# Patient Record
Sex: FEMALE | Race: OTHER | NOT HISPANIC OR LATINO | URBAN - METROPOLITAN AREA
[De-identification: names, ages, dates, MRNs, and addresses within clinical notes are randomized per-mention and may not be internally consistent; named-entity substitution may affect disease eponyms.]

---

## 2020-10-19 ENCOUNTER — EMERGENCY (EMERGENCY)
Facility: HOSPITAL | Age: 75
LOS: 0 days | Discharge: HOME | End: 2020-10-19
Attending: STUDENT IN AN ORGANIZED HEALTH CARE EDUCATION/TRAINING PROGRAM | Admitting: STUDENT IN AN ORGANIZED HEALTH CARE EDUCATION/TRAINING PROGRAM
Payer: COMMERCIAL

## 2020-10-19 VITALS
WEIGHT: 171.96 LBS | TEMPERATURE: 99 F | HEART RATE: 64 BPM | RESPIRATION RATE: 18 BRPM | DIASTOLIC BLOOD PRESSURE: 77 MMHG | SYSTOLIC BLOOD PRESSURE: 177 MMHG | OXYGEN SATURATION: 97 %

## 2020-10-19 DIAGNOSIS — J02.9 ACUTE PHARYNGITIS, UNSPECIFIED: ICD-10-CM

## 2020-10-19 DIAGNOSIS — J02.8 ACUTE PHARYNGITIS DUE TO OTHER SPECIFIED ORGANISMS: ICD-10-CM

## 2020-10-19 PROCEDURE — 99283 EMERGENCY DEPT VISIT LOW MDM: CPT

## 2020-10-19 PROCEDURE — 99053 MED SERV 10PM-8AM 24 HR FAC: CPT

## 2020-10-19 RX ORDER — DEXAMETHASONE 0.5 MG/5ML
10 ELIXIR ORAL ONCE
Refills: 0 | Status: COMPLETED | OUTPATIENT
Start: 2020-10-19 | End: 2020-10-19

## 2020-10-19 RX ORDER — ACETAMINOPHEN 500 MG
650 TABLET ORAL ONCE
Refills: 0 | Status: COMPLETED | OUTPATIENT
Start: 2020-10-19 | End: 2020-10-19

## 2020-10-19 RX ORDER — IBUPROFEN 200 MG
1 TABLET ORAL
Qty: 9 | Refills: 0
Start: 2020-10-19 | End: 2020-10-21

## 2020-10-19 RX ADMIN — Medication 10 MILLIGRAM(S): at 01:18

## 2020-10-19 RX ADMIN — Medication 650 MILLIGRAM(S): at 01:19

## 2020-10-19 RX ADMIN — Medication 650 MILLIGRAM(S): at 01:18

## 2020-10-19 NOTE — ED PROVIDER NOTE - PATIENT PORTAL LINK FT
You can access the FollowMyHealth Patient Portal offered by [facility] by registering at the following website: [email or web address]. By joining [employer]’s FollowMyHealth portal, you will also be able to view your health information using other applications (apps) compatible with our system.

## 2020-10-19 NOTE — ED PROVIDER NOTE - NS ED ROS FT
Constitutional: No fever, chills.  Eyes: No visual changes.  ENT: see hpi  Neck: No neck pain or stiffness.  Cardiovascular: No chest pain, palpitations, edema.  Pulmonary: No SOB, cough. No hemoptysis.  Abdominal:  No nausea, vomiting, diarrhea.  : No dysuria, frequency.  Neuro: No headache, syncope, dizziness.  MS: No back pain. No calf pain/swelling.  Psych: No suicidal ideations.

## 2020-10-19 NOTE — ED ADULT NURSE NOTE - OBJECTIVE STATEMENT
Pt presents to ED c/o sore throat since thurs after returning from [place]. Pt is English speaking; pt prefers daughter to translate. Pt is awake alert and not in any distress.

## 2020-10-19 NOTE — ED PROVIDER NOTE - OBJECTIVE STATEMENT
74 yold female to Ed [facility] Htn Hld, Dm, depression c/o sore throat difficulty swallowing, hoarse voice x 2 days; pt recently returned for [place]; sx gradual onset denies any trauma or caused by food intake; pt denies fever, chills, n/v, sob, chest pain, abdominal pain, back pain; pt compliant with meds and blood sugars well controlled

## 2020-10-19 NOTE — ED PROVIDER NOTE - ATTENDING CONTRIBUTION TO CARE
74F with PMH HTN, well-controlled T2DM who presents to [facility] for sore throat x2 days. She reports odynophagia and hoarse voice; denies dysphagia, oropharyngeal swelling, stridor, cough, or decreased oral intake. Her sister which lives in the same household also has a cold. Denies fevers, chills, n/v, rashes.     Gen - NAD, Head - NCAT, TMs - clear b/l, Pharynx - clear, MMM, uvula midline, no oropharyngeal erythema, no lymphadenopathy, Heart - RRR, no m/g/r, Lungs - CTAB, no w/c/r, Abdomen - soft, NT, ND, Skin - No rash, Extremities - FROM, no edema, erythema, ecchymosis, Neuro - CN 2-12 intact, nl strength and sensation, nl gait.

## 2020-10-19 NOTE — ED PROVIDER NOTE - CLINICAL SUMMARY MEDICAL DECISION MAKING FREE TEXT BOX
74F with PMH HTN, well-controlled T2DM who presents to [facility] for sore throat x2 days. She reports odynophagia and hoarse voice; denies dysphagia, oropharyngeal swelling, stridor, cough, or decreased oral intake. Her sister which lives in the same household also has a cold. Pt well appearing, afebrile, no oropharyngeal erythema or exudates. Uvula midline, no swelling, no lymphadenopathy. Suspect viral URI given household sick contacts. Pt given toradol, decadron, and requesting Rx; no indication for abx, will send motrin to pharmacy. I have fully discussed the medical management and delivery of care with the patient. I have discussed any available labs, imaging and treatment options with the patient. All Questions answered at the bedside. Patient confirms understanding and has been given detailed return precautions. Patient instructed to return to the ED should symptoms persist or worsen. Patient has demonstrated capacity and has verbalized understanding. Patient is well appearing upon discharge, ambulatory with a steady gait.

## 2020-10-19 NOTE — ED ADULT TRIAGE NOTE - CHIEF COMPLAINT QUOTE
Pt c/o sore throat and hurts to swallow and looses her voice, all started on Friday, denies cough and fever. Just recently traveled from [place].

## 2020-10-19 NOTE — ED PROVIDER NOTE - PHYSICAL EXAMINATION
Constitutional: Well developed, well nourished. NAD; pt laying flat on her back; no drooling/stridor;  Head: Normocephalic, atraumatic.  Eyes: PERRL, EOMI.  ENT: minimal pharyngeal erythema; no exudates, uvula midline; ; Ears: clear canals/tm  Neck: Supple. Painless ROM. no lymphadenopathy  Cardiovascular: Regular rate and rhythm.   Pulmonary: Lungs clear to auscultation bilaterally.   Abdominal: Soft. Nondistended. No rebound, guarding, rigidity.  Extremities. Pelvis stable. No lower extremity edema, symmetric calves.  Skin: No rashes, cyanosis.  Neuro: AAOx3. No focal neurological deficits.  Psych: Normal mood. Normal affect.

## 2020-10-19 NOTE — ED PROVIDER NOTE - NSFOLLOWUPCLINICS_GEN_ALL_ED_FT
[facility]  Medicine  [address], [place]   Phone: [phone]  Fax:   Follow Up Time: 1-3 Days

## 2020-10-19 NOTE — ED ADULT NURSE NOTE - CHIEF COMPLAINT QUOTE
Pt c/o sore throat and hurts to swallow and looses her voice, all started on Friday, denies cough and fever. Just recently traveled from [place].

## 2020-10-21 ENCOUNTER — EMERGENCY (EMERGENCY)
Facility: HOSPITAL | Age: 75
LOS: 0 days | Discharge: HOME | End: 2020-10-22
Attending: EMERGENCY MEDICINE | Admitting: EMERGENCY MEDICINE
Payer: COMMERCIAL

## 2020-10-21 VITALS
OXYGEN SATURATION: 96 % | WEIGHT: 187.39 LBS | DIASTOLIC BLOOD PRESSURE: 60 MMHG | HEART RATE: 60 BPM | TEMPERATURE: 99 F | RESPIRATION RATE: 20 BRPM | SYSTOLIC BLOOD PRESSURE: 141 MMHG

## 2020-10-21 DIAGNOSIS — I10 ESSENTIAL (PRIMARY) HYPERTENSION: ICD-10-CM

## 2020-10-21 DIAGNOSIS — R35.0 FREQUENCY OF MICTURITION: ICD-10-CM

## 2020-10-21 DIAGNOSIS — R06.02 SHORTNESS OF BREATH: ICD-10-CM

## 2020-10-21 DIAGNOSIS — U07.1 COVID-19: ICD-10-CM

## 2020-10-21 DIAGNOSIS — Z79.899 OTHER LONG TERM (CURRENT) DRUG THERAPY: ICD-10-CM

## 2020-10-21 DIAGNOSIS — E11.9 TYPE 2 DIABETES MELLITUS WITHOUT COMPLICATIONS: ICD-10-CM

## 2020-10-21 PROCEDURE — 99285 EMERGENCY DEPT VISIT HI MDM: CPT | Mod: 25

## 2020-10-21 PROCEDURE — 36000 PLACE NEEDLE IN VEIN: CPT

## 2020-10-21 PROCEDURE — 99053 MED SERV 10PM-8AM 24 HR FAC: CPT

## 2020-10-21 RX ORDER — SODIUM CHLORIDE 9 MG/ML
1000 INJECTION, SOLUTION INTRAVENOUS ONCE
Refills: 0 | Status: COMPLETED | OUTPATIENT
Start: 2020-10-21 | End: 2020-10-21

## 2020-10-21 NOTE — ED PROVIDER NOTE - NS ED ROS FT
Constitutional: see hpi  Eyes:  No visual changes, eye pain or discharge.  ENMT:  No hearing changes, pain, +sore throat no runny nose, no difficulty swallowing  Cardiac:  No chest pain, SOB or edema. No chest pain with exertion.  Respiratory:  +cough no respiratory distress. No hemoptysis. No history of asthma or RAD.  GI:  No nausea, vomiting, diarrhea or abdominal pain.  :  No dysuria, +frequency no burning.  MS:  No myalgia, muscle weakness, joint pain or back pain.  Neuro:  No headache or weakness.  No LOC.  Skin:  No skin rash.   Endocrine: No history of thyroid disease +diabetes.

## 2020-10-21 NOTE — ED PROVIDER NOTE - PATIENT PORTAL LINK FT
You can access the FollowMyHealth Patient Portal offered by [facility] by registering at the following website: [email or web address]. By joining [employer]’s FollowMyHealth portal, you will also be able to view your health information using other applications (apps) compatible with our system.

## 2020-10-21 NOTE — ED PROVIDER NOTE - NSFOLLOWUPCLINICS_GEN_ALL_ED_FT
[facility]  Medicine  [address], [place]   Phone: [phone]  Fax:   Follow Up Time: 7-10 Days

## 2020-10-21 NOTE — ED PROVIDER NOTE - PROVIDER TOKENS
FREE:[LAST:[PMD],PHONE:[(   )    -],FAX:[(   )    -],ADDRESS:[a PMD],FOLLOWUP:[7-10 Days],ESTABLISHEDPATIENT:[T]]

## 2020-10-21 NOTE — ED PROVIDER NOTE - PHYSICAL EXAMINATION
PE:  nad  skin hot, diaphoretic  no rhinorrea, op clear pharynx nl  ncat  neck supple  rrr nl s1s2 no mrg  ctab no wrr  abd soft ntnd no palpable masses no rgr  back non-tender no cvat  ext no cce dpi  neuro aaox3 grossly nf exam

## 2020-10-21 NOTE — ED PROVIDER NOTE - PROGRESS NOTE DETAILS
Post Discharge note: received notification from [facility]: blood cultures positive for gram + cocci in clusters; attempted to call pt back on number listed - no answer; will try again in am; after review of chart pt also noted to be covid +;

## 2020-10-21 NOTE — ED PROVIDER NOTE - NSFOLLOWUPINSTRUCTIONS_ED_ALL_ED_FT
You are being discharged with a diagnosis of viral illness and do not require hospitalization.  At this time, we are suspicious that you might have coronavirus. Your test results will be texted to your granddaughter Payal as requested.    If you are well enough to be discharged home and are not in a high risk group to be admitted, you should care for yourself at home exactly like you would if you have Influenza “flu”. Follow all the standard guidelines about washing your hands, covering your cough, etc. If you feel unwell, stay home, rest and drink plenty of clear fluids. Keep track of your symptoms. You should return to the Emergency Department if you develop worse symptoms, trouble breathing, chest pain, and/or a fever that doesn’t improve with over the counter medications.    ______________________________________________________________________________  NOVEL CORONA VIRUS (COVID-19): The Facts    What is a coronavirus?  Coronaviruses are a large family of viruses that cause illnesses ranging from the common cold to more severe diseases such as Middle East Respiratory Syndrome (MERS) and Severe Acute Respiratory Syndrome (SARS).    What is Novel Coronavirus (COVID-19)?  COVID-19 is a new strain of Coronavirus that has not been previously identified in humans. COVID-19 was identified in [place], [place], [place] in December 2019 (COVID-19). COVID-19 has since been identified outside of [place], in a growing number of countries internationally, including the [place].    Where can I find the most recent information about COVID-19?  The Centers for Disease Control and Prevention (CDC) is closely monitoring the outbreak caused by the COVID-19. For the latest information about COVID- 19, visit the CDC website at https://www.cdc.gov/coronavirus/index.html    How are coronaviruses spread?  Coronaviruses can be transmitted from person-to- person, usually after close contact with an infected person,for example, in a household, workplace, or healthcare setting via droplets that become airborne after a cough or sneeze by an affected person. These droplets can then infect a nearby person. It is likely transmission also occurs by touching recently contaminated surfaces.    What are the symptoms of coronavirus infection?  It depends on the virus, but common signs include fever and/or respiratory symptoms such as cough and shortness of breath. In more severe cases, infection can cause pneumonia, severe acute respiratory syndrome, kidney failure and even death. Fortunately, most cases of COVID-19 have an illness no different than the influenza “flu”. With a majority of these patients having mild symptoms and overall mortality which appears to be not much different than the flu.    Is there a treatment for a COVID-19?  There is no specific treatment for disease caused by COVID-19. However, many of the symptoms canbe treated based on the patient’s clinical condition. Supportive care for infected persons can be highly effective.    What can I do to protect myself?  Washing your hands, covering your cough, and disinfecting surfaces are the best precautionary measures. It is also advisable to avoid close contact with anyone showing symptoms of respiratory illness such as coughing and sneezing. Those with symptoms should wear a surgical mask when around others.    What can I do to protect those around me?  If you have been identified as someone who may be infected with COVID-19, we recommend you follow the self-isolation procedures outlined below to protect those around you and limit the spread of this virus.     Recommendations for Patients Advised to Self-Isolate for Possible COVID-19 Exposure  - Do not go to work, school, or public areas. Avoid using public transportation, ride-sharing, or taxis.  - As much as possible, separate yourself from other people in your home. If you can, you should stay in a room and away from other people in your home. Also, you should use a separate bathroom, if available.  - Wear the supplied mask whenever you are around other people.  - If you have a non-urgent medical appointment, please reschedule for a later date. If the appointment is urgent, please call the healthcare provider and tell them that you are on selfisolation for possible COVID-19. This will help the healthcare provider’s office take steps to keep other people from getting infected or exposed. If you can reschedule routine appointments, do so.  - Wash your hands often with soap and water for at least 15 to 20 seconds or clean your hands with an alcohol-based hand  that contains 60 to 95% alcohol, covering all surfaces of your hands and rubbing them together until they feel dry. Soap and water should be used preferentially if hands are visibly dirty.  - Cover your mouth and nose with a tissue when you cough or sneeze. Throw used tissues in a lined trash can; immediately wash your hands.  - Avoid touching your eyes, nose, and mouth with your hands.  - Avoid sharing personal household items. You should not share dishes, drinking glasses, cups, eating utensils, towels, or bedding with other people or pets in your home. After using these items, they should be washed thoroughly with soap and water.  - Clean and disinfect all “high-touch” surfaces every day. High touch surfaces include counters, tabletops, doorknobs, light switches, remote controls, bathroom fixtures, toilets, phones, keyboards, tablets, and bedside tables. Also, clean any surfaces that may have blood, stool, or body fluids on them.   -Please also refer to this helpful video on how to set up your home for self-quarantine or self-isolation: https://youtu.[email or web address]

## 2020-10-21 NOTE — ED PROVIDER NOTE - CARE PROVIDER_API CALL
PMD,   stacey PMD  Phone: (   )    -  Fax: (   )    -  Established Patient  Follow Up Time: 7-10 Days

## 2020-10-21 NOTE — ED ADULT NURSE NOTE - CHPI ED NUR SYMPTOMS NEG
no body aches/no chest pain/no chills/no cough/no wheezing/no diaphoresis/no edema/no headache/no hemoptysis

## 2020-10-21 NOTE — ED ADULT NURSE NOTE - CHIEF COMPLAINT QUOTE
Pt was brought by her family for sob, fever 102, urinary incontinence. Pt recently returned from [place], recent exposure to COVID "+" relatives.

## 2020-10-21 NOTE — ED ADULT NURSE NOTE - OBJECTIVE STATEMENT
Pt was brought by her family for sob, fever 102, urinary incontinence. Pt recently returned from [place], recent exposure to COVID "+" relatives.

## 2020-10-21 NOTE — ED ADULT TRIAGE NOTE - CHIEF COMPLAINT QUOTE
Pt was brought by her family for sob, fever 102, urinary incontinence. Pt recently returned from [place], recent exposure to COVID "+" relatives.

## 2020-10-21 NOTE — ED PROVIDER NOTE - OBJECTIVE STATEMENT
74F h/o dm, htn p/w viral sx x 5d. Fever, diaphoresis, sore throat, cough & urinary frequency. Visiting from [place], [place] to [place] ago, family rpts that she visited her sister in [place] 2d prior to travelling here & family just found out that sister has active covid. Denies ha, neck pain or stiffness, cp/sob/moise, nvd, abd pain, flank pain, dysuria, rash. No recent abx.

## 2020-10-22 VITALS
HEART RATE: 76 BPM | DIASTOLIC BLOOD PRESSURE: 69 MMHG | RESPIRATION RATE: 18 BRPM | OXYGEN SATURATION: 99 % | TEMPERATURE: 99 F | SYSTOLIC BLOOD PRESSURE: 142 MMHG

## 2020-10-22 LAB
ALBUMIN SERPL ELPH-MCNC: 3.9 G/DL — SIGNIFICANT CHANGE UP (ref 3.5–5.2)
ALP SERPL-CCNC: 60 U/L — SIGNIFICANT CHANGE UP (ref 30–115)
ALT FLD-CCNC: 8 U/L — SIGNIFICANT CHANGE UP (ref 0–41)
ANION GAP SERPL CALC-SCNC: 11 MMOL/L — SIGNIFICANT CHANGE UP (ref 7–14)
APPEARANCE UR: CLEAR — SIGNIFICANT CHANGE UP
APTT BLD: 28.4 SEC — SIGNIFICANT CHANGE UP (ref 27–39.2)
AST SERPL-CCNC: 15 U/L — SIGNIFICANT CHANGE UP (ref 0–41)
BACTERIA # UR AUTO: NEGATIVE — SIGNIFICANT CHANGE UP
BASOPHILS # BLD AUTO: 0.01 K/UL — SIGNIFICANT CHANGE UP (ref 0–0.2)
BASOPHILS NFR BLD AUTO: 0.2 % — SIGNIFICANT CHANGE UP (ref 0–1)
BILIRUB SERPL-MCNC: <0.2 MG/DL — SIGNIFICANT CHANGE UP (ref 0.2–1.2)
BILIRUB UR-MCNC: NEGATIVE — SIGNIFICANT CHANGE UP
BUN SERPL-MCNC: 12 MG/DL — SIGNIFICANT CHANGE UP (ref 10–20)
CALCIUM SERPL-MCNC: 9.1 MG/DL — SIGNIFICANT CHANGE UP (ref 8.5–10.1)
CHLORIDE SERPL-SCNC: 96 MMOL/L — LOW (ref 98–110)
CO2 SERPL-SCNC: 29 MMOL/L — SIGNIFICANT CHANGE UP (ref 17–32)
COLOR SPEC: SIGNIFICANT CHANGE UP
CREAT SERPL-MCNC: 0.7 MG/DL — SIGNIFICANT CHANGE UP (ref 0.7–1.5)
DIFF PNL FLD: NEGATIVE — SIGNIFICANT CHANGE UP
EOSINOPHIL # BLD AUTO: 0.02 K/UL — SIGNIFICANT CHANGE UP (ref 0–0.7)
EOSINOPHIL NFR BLD AUTO: 0.4 % — SIGNIFICANT CHANGE UP (ref 0–8)
EPI CELLS # UR: 1 /HPF — SIGNIFICANT CHANGE UP (ref 0–5)
GLUCOSE SERPL-MCNC: 127 MG/DL — HIGH (ref 70–99)
GLUCOSE UR QL: NEGATIVE — SIGNIFICANT CHANGE UP
HCT VFR BLD CALC: 35.3 % — LOW (ref 37–47)
HGB BLD-MCNC: 10.9 G/DL — LOW (ref 12–16)
HYALINE CASTS # UR AUTO: 1 /LPF — SIGNIFICANT CHANGE UP (ref 0–7)
IMM GRANULOCYTES NFR BLD AUTO: 0.4 % — HIGH (ref 0.1–0.3)
INR BLD: 1.07 RATIO — SIGNIFICANT CHANGE UP (ref 0.65–1.3)
KETONES UR-MCNC: NEGATIVE — SIGNIFICANT CHANGE UP
LACTATE SERPL-SCNC: 1.6 MMOL/L — SIGNIFICANT CHANGE UP (ref 0.7–2)
LEUKOCYTE ESTERASE UR-ACNC: NEGATIVE — SIGNIFICANT CHANGE UP
LYMPHOCYTES # BLD AUTO: 1.13 K/UL — LOW (ref 1.2–3.4)
LYMPHOCYTES # BLD AUTO: 24.7 % — SIGNIFICANT CHANGE UP (ref 20.5–51.1)
MCHC RBC-ENTMCNC: 24.2 PG — LOW (ref 27–31)
MCHC RBC-ENTMCNC: 30.9 G/DL — LOW (ref 32–37)
MCV RBC AUTO: 78.4 FL — LOW (ref 81–99)
MONOCYTES # BLD AUTO: 0.31 K/UL — SIGNIFICANT CHANGE UP (ref 0.1–0.6)
MONOCYTES NFR BLD AUTO: 6.8 % — SIGNIFICANT CHANGE UP (ref 1.7–9.3)
NEUTROPHILS # BLD AUTO: 3.08 K/UL — SIGNIFICANT CHANGE UP (ref 1.4–6.5)
NEUTROPHILS NFR BLD AUTO: 67.5 % — SIGNIFICANT CHANGE UP (ref 42.2–75.2)
NITRITE UR-MCNC: NEGATIVE — SIGNIFICANT CHANGE UP
NRBC # BLD: 0 /100 WBCS — SIGNIFICANT CHANGE UP (ref 0–0)
PH UR: 7 — SIGNIFICANT CHANGE UP (ref 5–8)
PLATELET # BLD AUTO: 222 K/UL — SIGNIFICANT CHANGE UP (ref 130–400)
POTASSIUM SERPL-MCNC: 3.9 MMOL/L — SIGNIFICANT CHANGE UP (ref 3.5–5)
POTASSIUM SERPL-SCNC: 3.9 MMOL/L — SIGNIFICANT CHANGE UP (ref 3.5–5)
PROT SERPL-MCNC: 6.8 G/DL — SIGNIFICANT CHANGE UP (ref 6–8)
PROT UR-MCNC: ABNORMAL
PROTHROM AB SERPL-ACNC: 12.3 SEC — SIGNIFICANT CHANGE UP (ref 9.95–12.87)
RBC # BLD: 4.5 M/UL — SIGNIFICANT CHANGE UP (ref 4.2–5.4)
RBC # FLD: 13.9 % — SIGNIFICANT CHANGE UP (ref 11.5–14.5)
RBC CASTS # UR COMP ASSIST: 4 /HPF — SIGNIFICANT CHANGE UP (ref 0–4)
SARS-COV-2 RNA SPEC QL NAA+PROBE: DETECTED
SODIUM SERPL-SCNC: 136 MMOL/L — SIGNIFICANT CHANGE UP (ref 135–146)
SP GR SPEC: 1.01 — SIGNIFICANT CHANGE UP (ref 1.01–1.03)
UROBILINOGEN FLD QL: SIGNIFICANT CHANGE UP
WBC # BLD: 4.57 K/UL — LOW (ref 4.8–10.8)
WBC # FLD AUTO: 4.57 K/UL — LOW (ref 4.8–10.8)
WBC UR QL: 3 /HPF — SIGNIFICANT CHANGE UP (ref 0–5)

## 2020-10-22 PROCEDURE — 93010 ELECTROCARDIOGRAM REPORT: CPT

## 2020-10-22 PROCEDURE — 71045 X-RAY EXAM CHEST 1 VIEW: CPT | Mod: 26

## 2020-10-22 RX ADMIN — SODIUM CHLORIDE 1000 MILLILITER(S): 9 INJECTION, SOLUTION INTRAVENOUS at 01:05

## 2020-10-23 ENCOUNTER — INPATIENT (INPATIENT)
Facility: HOSPITAL | Age: 75
LOS: 0 days | Discharge: HOME | End: 2020-10-24
Attending: INTERNAL MEDICINE | Admitting: INTERNAL MEDICINE
Payer: MEDICAID

## 2020-10-23 VITALS
OXYGEN SATURATION: 95 % | DIASTOLIC BLOOD PRESSURE: 68 MMHG | TEMPERATURE: 98 F | HEART RATE: 62 BPM | RESPIRATION RATE: 20 BRPM | SYSTOLIC BLOOD PRESSURE: 158 MMHG

## 2020-10-23 PROBLEM — E11.9 TYPE 2 DIABETES MELLITUS WITHOUT COMPLICATIONS: Chronic | Status: ACTIVE | Noted: 2020-10-21

## 2020-10-23 PROBLEM — I10 ESSENTIAL (PRIMARY) HYPERTENSION: Chronic | Status: ACTIVE | Noted: 2020-10-21

## 2020-10-23 LAB
-  COAGULASE NEGATIVE STAPHYLOCOCCUS: SIGNIFICANT CHANGE UP
ALBUMIN SERPL ELPH-MCNC: 3.9 G/DL — SIGNIFICANT CHANGE UP (ref 3.5–5.2)
ALP SERPL-CCNC: 58 U/L — SIGNIFICANT CHANGE UP (ref 30–115)
ALT FLD-CCNC: 10 U/L — SIGNIFICANT CHANGE UP (ref 0–41)
ANION GAP SERPL CALC-SCNC: 11 MMOL/L — SIGNIFICANT CHANGE UP (ref 7–14)
AST SERPL-CCNC: 32 U/L — SIGNIFICANT CHANGE UP (ref 0–41)
BASOPHILS # BLD AUTO: 0 K/UL — SIGNIFICANT CHANGE UP (ref 0–0.2)
BASOPHILS NFR BLD AUTO: 0 % — SIGNIFICANT CHANGE UP (ref 0–1)
BILIRUB SERPL-MCNC: 0.2 MG/DL — SIGNIFICANT CHANGE UP (ref 0.2–1.2)
BUN SERPL-MCNC: 15 MG/DL — SIGNIFICANT CHANGE UP (ref 10–20)
CALCIUM SERPL-MCNC: 9.4 MG/DL — SIGNIFICANT CHANGE UP (ref 8.5–10.1)
CHLORIDE SERPL-SCNC: 97 MMOL/L — LOW (ref 98–110)
CO2 SERPL-SCNC: 27 MMOL/L — SIGNIFICANT CHANGE UP (ref 17–32)
CREAT SERPL-MCNC: 0.8 MG/DL — SIGNIFICANT CHANGE UP (ref 0.7–1.5)
CULTURE RESULTS: SIGNIFICANT CHANGE UP
D DIMER BLD IA.RAPID-MCNC: 527 NG/ML DDU — HIGH (ref 0–230)
D DIMER BLD IA.RAPID-MCNC: SIGNIFICANT CHANGE UP NG/ML DDU (ref 0–230)
EOSINOPHIL # BLD AUTO: 0.02 K/UL — SIGNIFICANT CHANGE UP (ref 0–0.7)
EOSINOPHIL NFR BLD AUTO: 0.5 % — SIGNIFICANT CHANGE UP (ref 0–8)
GLUCOSE BLDC GLUCOMTR-MCNC: 109 MG/DL — HIGH (ref 70–99)
GLUCOSE BLDC GLUCOMTR-MCNC: 136 MG/DL — HIGH (ref 70–99)
GLUCOSE SERPL-MCNC: 111 MG/DL — HIGH (ref 70–99)
GRAM STN FLD: SIGNIFICANT CHANGE UP
HCT VFR BLD CALC: 35.9 % — LOW (ref 37–47)
HGB BLD-MCNC: 11.2 G/DL — LOW (ref 12–16)
IMM GRANULOCYTES NFR BLD AUTO: 0.2 % — SIGNIFICANT CHANGE UP (ref 0.1–0.3)
LACTATE SERPL-SCNC: 1.5 MMOL/L — SIGNIFICANT CHANGE UP (ref 0.7–2)
LYMPHOCYTES # BLD AUTO: 1.05 K/UL — LOW (ref 1.2–3.4)
LYMPHOCYTES # BLD AUTO: 26.1 % — SIGNIFICANT CHANGE UP (ref 20.5–51.1)
MCHC RBC-ENTMCNC: 24.2 PG — LOW (ref 27–31)
MCHC RBC-ENTMCNC: 31.2 G/DL — LOW (ref 32–37)
MCV RBC AUTO: 77.7 FL — LOW (ref 81–99)
METHOD TYPE: SIGNIFICANT CHANGE UP
MONOCYTES # BLD AUTO: 0.24 K/UL — SIGNIFICANT CHANGE UP (ref 0.1–0.6)
MONOCYTES NFR BLD AUTO: 6 % — SIGNIFICANT CHANGE UP (ref 1.7–9.3)
NEUTROPHILS # BLD AUTO: 2.7 K/UL — SIGNIFICANT CHANGE UP (ref 1.4–6.5)
NEUTROPHILS NFR BLD AUTO: 67.2 % — SIGNIFICANT CHANGE UP (ref 42.2–75.2)
NRBC # BLD: 0 /100 WBCS — SIGNIFICANT CHANGE UP (ref 0–0)
PLATELET # BLD AUTO: 257 K/UL — SIGNIFICANT CHANGE UP (ref 130–400)
POTASSIUM SERPL-MCNC: 4.9 MMOL/L — SIGNIFICANT CHANGE UP (ref 3.5–5)
POTASSIUM SERPL-SCNC: 4.9 MMOL/L — SIGNIFICANT CHANGE UP (ref 3.5–5)
PROT SERPL-MCNC: 7.2 G/DL — SIGNIFICANT CHANGE UP (ref 6–8)
RBC # BLD: 4.62 M/UL — SIGNIFICANT CHANGE UP (ref 4.2–5.4)
RBC # FLD: 13.9 % — SIGNIFICANT CHANGE UP (ref 11.5–14.5)
SODIUM SERPL-SCNC: 135 MMOL/L — SIGNIFICANT CHANGE UP (ref 135–146)
SPECIMEN SOURCE: SIGNIFICANT CHANGE UP
WBC # BLD: 4.02 K/UL — LOW (ref 4.8–10.8)
WBC # FLD AUTO: 4.02 K/UL — LOW (ref 4.8–10.8)

## 2020-10-23 PROCEDURE — 71045 X-RAY EXAM CHEST 1 VIEW: CPT | Mod: 26

## 2020-10-23 PROCEDURE — 99285 EMERGENCY DEPT VISIT HI MDM: CPT

## 2020-10-23 PROCEDURE — 93010 ELECTROCARDIOGRAM REPORT: CPT

## 2020-10-23 RX ORDER — AMLODIPINE BESYLATE 2.5 MG/1
5 TABLET ORAL DAILY
Refills: 0 | Status: DISCONTINUED | OUTPATIENT
Start: 2020-10-23 | End: 2020-10-24

## 2020-10-23 RX ORDER — AZITHROMYCIN 500 MG/1
500 TABLET, FILM COATED ORAL ONCE
Refills: 0 | Status: COMPLETED | OUTPATIENT
Start: 2020-10-23 | End: 2020-10-23

## 2020-10-23 RX ORDER — ASPIRIN/CALCIUM CARB/MAGNESIUM 324 MG
100 TABLET ORAL DAILY
Refills: 0 | Status: DISCONTINUED | OUTPATIENT
Start: 2020-10-23 | End: 2020-10-24

## 2020-10-23 RX ORDER — SIMVASTATIN 20 MG/1
20 TABLET, FILM COATED ORAL AT BEDTIME
Refills: 0 | Status: DISCONTINUED | OUTPATIENT
Start: 2020-10-23 | End: 2020-10-24

## 2020-10-23 RX ORDER — ENOXAPARIN SODIUM 100 MG/ML
40 INJECTION SUBCUTANEOUS DAILY
Refills: 0 | Status: DISCONTINUED | OUTPATIENT
Start: 2020-10-23 | End: 2020-10-24

## 2020-10-23 RX ORDER — CEFTRIAXONE 500 MG/1
1000 INJECTION, POWDER, FOR SOLUTION INTRAMUSCULAR; INTRAVENOUS ONCE
Refills: 0 | Status: COMPLETED | OUTPATIENT
Start: 2020-10-23 | End: 2020-10-23

## 2020-10-23 RX ORDER — CHLORHEXIDINE GLUCONATE 213 G/1000ML
1 SOLUTION TOPICAL DAILY
Refills: 0 | Status: DISCONTINUED | OUTPATIENT
Start: 2020-10-23 | End: 2020-10-24

## 2020-10-23 RX ORDER — SIMVASTATIN 20 MG/1
40 TABLET, FILM COATED ORAL AT BEDTIME
Refills: 0 | Status: DISCONTINUED | OUTPATIENT
Start: 2020-10-23 | End: 2020-10-23

## 2020-10-23 RX ORDER — ATENOLOL 25 MG/1
50 TABLET ORAL DAILY
Refills: 0 | Status: DISCONTINUED | OUTPATIENT
Start: 2020-10-23 | End: 2020-10-24

## 2020-10-23 RX ORDER — PANTOPRAZOLE SODIUM 20 MG/1
40 TABLET, DELAYED RELEASE ORAL
Refills: 0 | Status: DISCONTINUED | OUTPATIENT
Start: 2020-10-23 | End: 2020-10-24

## 2020-10-23 RX ORDER — DULOXETINE HYDROCHLORIDE 30 MG/1
30 CAPSULE, DELAYED RELEASE ORAL EVERY 12 HOURS
Refills: 0 | Status: DISCONTINUED | OUTPATIENT
Start: 2020-10-23 | End: 2020-10-24

## 2020-10-23 RX ADMIN — CEFTRIAXONE 100 MILLIGRAM(S): 500 INJECTION, POWDER, FOR SOLUTION INTRAMUSCULAR; INTRAVENOUS at 13:59

## 2020-10-23 RX ADMIN — ENOXAPARIN SODIUM 40 MILLIGRAM(S): 100 INJECTION SUBCUTANEOUS at 17:38

## 2020-10-23 RX ADMIN — AZITHROMYCIN 255 MILLIGRAM(S): 500 TABLET, FILM COATED ORAL at 14:00

## 2020-10-23 NOTE — ED PROVIDER NOTE - ATTENDING CONTRIBUTION TO CARE
74F hx of HTN, DM, HLD, CAD p/w viral symptoms x 6 days. Patient admits to  fever, sore throat, cough. Visiting from [place], [place] to [place] ago, pt sister was recently diagnosed with COVID, patient denies any chest pain or shortness of breath, states she was called back about blood today. Patient was seen in ED on 10/22, CXR with bilateral opacities, COVID +. admits to loss of appetite and loss of taste.     Interpretor # [id number]    Review of Systems    Constitutional: (-) fever  Cardiovascular: (-) chest pain, (-) syncope  Respiratory: (-) cough, (-) shortness of breath  Gastrointestinal: (-) vomiting, (-) diarrhea, (-) abdominal pain  Musculoskeletal: (-) neck pain, (-) back pain, (-) joint pain  Integumentary: (-) rash, (-) edema  Neurological: (-) headache, (-) altered mental status    Except as documented in the HPI, all other systems are negative.    VITAL SIGNS: I have reviewed nursing notes and confirm.  CONSTITUTIONAL: non-toxic, well appearing  SKIN: no rash, no petechiae.  EYES: PERRL, EOMI, pink conjunctiva, anicteric  ENT: tongue midline, no exudates, MMM  NECK: Supple; no meningismus, no JVD  CARD: RRR, no murmurs, equal radial pulses bilaterally 2+  RESP: CTAB, no respiratory distress  ABD: Soft, non-tender, non-distended, no peritoneal signs, no HSM, no CVA tenderness  EXT: Normal ROM x4. No edema. No calves tenderness  NEURO: Alert, oriented. CN2-12 intact, equal strength bilaterally, nl gait.  PSYCH: Cooperative, appropriate.    a/p  74 yr old f that presents with positive blood cultures known to be covid positive. On exam pt also noted to have an O2 of 95 denies any SOB.   -labs  -ekg  -cxr  -of note, pt noted to have opacities on CXR from yesterday, will treat for possible PNA   -admit

## 2020-10-23 NOTE — H&P ADULT - NSHPLABSRESULTS_GEN_ALL_CORE
11.2   4.02  )-----------( 257      ( 23 Oct 2020 13:20 )             35.9   10-23    135  |  97<L>  |  15  ----------------------------<  111<H>  4.9   |  27  |  0.8    Ca    9.4      23 Oct 2020 13:20    TPro  7.2  /  Alb  3.9  /  TBili  0.2  /  DBili  x   /  AST  32  /  ALT  10  /  AlkPhos  58  10-23    D-dimer: Not reported ( not enough quantity in tube, will repeat)       Culture - Blood (10.22.20 @ 00:45)   - Coagulase negative Staphylococcus: Detec   Gram Stain:   Growth in aerobic bottle: Gram Positive Cocci in Clusters   Growth in anaerobic bottle: Gram Positive Cocci in Clusters   Specimen Source: .Blood Blood-Peripheral   Organism: Blood Culture PCR   Culture Results:   Growth in aerobic bottle: Gram Positive Cocci in Clusters   Growth in anaerobic bottle: Gram Positive Cocci in Clusters   ***Blood Panel PCR results on this specimen are available   approximately 3 hours after the Gram stain result.***    COVID-19 PCR: Detected    denita< from: Xray Chest 1 View-PORTABLE IMMEDIATE (10.22.20 @ 01:07) >    Chest Xray:   Impression:  Diffuse bilateral interstitialopacities. Radiographic follow-up to resolution recommended.  < end of copied text >

## 2020-10-23 NOTE — ED PROVIDER NOTE - PHYSICAL EXAMINATION
VITAL SIGNS: I have reviewed nursing notes and confirm.  CONSTITUTIONAL: well-appearing, non-toxic, NAD  SKIN: Warm dry, normal skin turgor  HEAD: NCAT  EYES: EOMI, PERRLA, no scleral icterus  ENT: Moist mucous membranes, normal pharynx   NECK: Supple; non tender. Full ROM.   CARD: RRR, no murmurs, rubs or gallops  RESP: clear to ausculation b/l.  No rales, rhonchi, or wheezing.  ABD: soft, + BS, non-tender, non-distended, no rebound or guarding. No CVA tenderness  EXT: Full ROM, no bony tenderness, no pedal edema, no calf tenderness  NEURO: normal motor. normal sensory. Normal gait.  PSYCH: Cooperative, appropriate.

## 2020-10-23 NOTE — PROVIDER CONTACT NOTE (OTHER) - SITUATION
Patient admitted from ED- Vietnamese speaking with  phone used at bedside. Patient stated she already took BP meds this morning as well as cymbalta

## 2020-10-23 NOTE — ED PROVIDER NOTE - NS ED ROS FT
Constitutional: See HPI.  Eyes: No visual changes, eye pain or discharge. No Photophobia  ENMT: see hpi   Cardiac: No SOB or edema. No chest pain with exertion.  Respiratory: No cough or respiratory distress.   GI: No nausea, vomiting, diarrhea or abdominal pain.  : see hpi   MS: see hpi   Neuro: No headache or weakness. No LOC.  Skin: No skin rash.  Except as documented in the HPI, all other systems are negative.

## 2020-10-23 NOTE — POST DISCHARGE NOTE - NOTIFICATION:
I called [phone] go notify pt of blood culture and covid results; no response; left a message to call back x [phone];

## 2020-10-23 NOTE — H&P ADULT - HISTORY OF PRESENT ILLNESS
74-year-old, female patient with a past medical history of HTN, DM, HLD, CAD. She presented for a viral-like symptoms that have been going on for the past 6 days. The patient endorses having fever (undocumented, afebrile here), sore throat, and a dry non-productive cough, she also reports anosmia and decreased appetite The patient is [place] and [place] back to [place] ago (she was in the [place] denies sick contacts at home or overseas, patient denies any chest pain or shortness of breath at the moment. The patient was in the ER and sent home, but was called back because of positive blood cultures.  Patient was seen in ED on 10/22, CXR with bilateral opacities, COVID-19 positive.      74-year-old, female patient with a past medical history of HTN, DM, HLD, CAD. She presented for a viral-like symptoms that have been going on for the past 6 days. The patient endorses having fever (undocumented, afebrile here), sore throat, and a dry non-productive cough, she also reports anosmia and decreased appetite The patient is Citizen of [place] (resides in [place] and [place] to the [place] for a visit approximately 1 week ago, denies sick contacts at home or overseas, patient denies any chest pain or shortness of breath at the moment. The patient was in the ER and sent home, but was called back because of positive blood cultures.  Patient was seen in ED on 10/22, CXR with bilateral opacities, COVID-19 positive.

## 2020-10-23 NOTE — H&P ADULT - ATTENDING COMMENTS
HPI:  74-year-old, female patient with a past medical history of HTN, DM, HLD, CAD. She presented for a viral-like symptoms that have been going on for the past 6 days. The patient endorses having fever (undocumented, afebrile here), sore throat, and a dry non-productive cough, she also reports anosmia and decreased appetite The patient is [place] (resides in [place] and flew to the [place] for a visit approximately 1 week ago, denies sick contacts at home or overseas. The patient was in the ER and sent home, but was called back because of positive blood cultures.  Patient was seen in ED on 10/22, CXR with bilateral opacities, COVID-19 positive.   At this time she feels well and has no major complaints. She denied having CP, SOB, Palpitations, n/v/d, or abdominal pain.     REVIEW OF SYSTEMS:  CONSTITUTIONAL:  No weakness, fevers chills, night sweats, weight loss  EYES/ENT: No visual changes. No vertigo or dysphagia  NECK: No neck pain or stiffness  RESPIRATORY: No cough, wheezing, hemoptysis. No shortness of breath  CARDIOVASCULAR: No chest pain or palpitations. No lower extremity edema  GASTROINTESTINAL: No abdominal pain. No nausea, vomiting, diarrhea, or hematemesis  GENITOURINARY: No dysuria or hematuria   NEUROLOGICAL: No focal numbness or weakness  SKIN: No rashes or itching  HEMATOLOGIC: No easy bruising or prolonged bleeding.      PHYSICAL EXAM:  GENERAL: NAD, obese, Non-toxic, stated age   HEAD:  Atraumatic, Normocephalic  EYES: EOMI, Sclera White   NECK: Supple, No JVD  CHEST/LUNG: Clear to auscultation bilaterally with the exception of minimal crackles at the lower lung base; No wheezing, rhonchi  HEART: Regular rate and rhythm; s1, s2, No murmurs, rubs, or gallops  ABDOMEN: Soft, Nontender, Nondistended; Bowel sounds present, No rebound or guarding noted   EXTREMITIES:  No lower extremity edema or calf tenderness to palpation.  No clubbing or cyanosis  PSYCH: AAOx3, pleasant, cooperative, not anxious  NEUROLOGY: non-focal  SKIN: No rashes or lesions      ASSESSMENT AND PLAN:  Gram Positive blood cultures (Staph Epi and hominis): contaminants, no evidence of SIRS or systemic infection, no abx at this time    COVID 19 pneumonia: Currently mild, not requiring supplemental O2. No need for dexamethasone at this time. Will leave remdesivir to the discretion of ID. Follow up inflammatory labs     HTN: continue home medications  CAD: Continue home medication  DM: basal bolus insulin, keep finger stick glucose <180  DVT ppx: lovenox  GI ppx: not indicated   Full code     My note supersedes the residents in the event of a discrepancy.

## 2020-10-23 NOTE — ED POST DISCHARGE NOTE - DETAILS
CXR- DIFFUSED B/L INTERSTITIAL OPACITIES, RADIOGRAPHIC F/U ADVISED BY RADIOLOGIST TO ENSURE RESOLUTION IS RETURNING TO ED NOW, OBS ANTONI WASHINGTON NOTIFIED.

## 2020-10-23 NOTE — ED PROVIDER NOTE - CARE PLAN
Principal Discharge DX:	COVID-19  Secondary Diagnosis:	PNA (pneumonia)   Principal Discharge DX:	COVID-19  Secondary Diagnosis:	PNA (pneumonia)  Secondary Diagnosis:	Positive blood culture

## 2020-10-23 NOTE — ED PROVIDER NOTE - CLINICAL SUMMARY MEDICAL DECISION MAKING FREE TEXT BOX
pt presents due to positive blood cultures. covid positive. labs, cxr, ekg obtained. pt given antibiotics. pt admitted to medicine.

## 2020-10-23 NOTE — ED PROVIDER NOTE - OBJECTIVE STATEMENT
74F hx of HTN, DM, HLD, CAD p/w viral symptoms x 6 days. Patient admits to  fever, sore throat, cough. Visiting from [place], [place], denies sick contacts at home or overseas, patient denies any chest pain or shortness of breath, states she was called back about blood today. Patient was seen in ED on 10/22, CXR with bilateral opacities, COVID +. admits to loss of appetite and loss of taste. 74F hx of HTN, DM, HLD, CAD p/w viral symptoms x 6 days. Patient admits to  fever, sore throat, cough. Visiting from [place], [place], denies sick contacts at home or overseas, patient denies any chest pain or shortness of breath, states she was called back about blood today. Patient was seen in ED on 10/22, CXR with bilateral opacities, COVID +. admits to loss of appetite and loss of taste.     Interpretor # [id number]

## 2020-10-23 NOTE — ED POST DISCHARGE NOTE - RESULT SUMMARY
+ BLOOD CX- GRAM POS. COCCI NOTED- WILL BE TOLD TO RETURN TO ED ASAP FOR REPEAT BLOOD CX AND RE-EVALUATION

## 2020-10-23 NOTE — H&P ADULT - NSHPPHYSICALEXAM_GEN_ALL_CORE
General: No acute distress   Neck: Supple, no JVD   Heart: RRR, normal s1s2, NO audible murmurs   Lungs: GBAE, no crackles, ronchi or wheezes  Abdomen: +BS, soft, nontender, non-distended.   LL: Bilateral LLE above the ankle, not pitting. No signs of DVT, +PD bilaterally.

## 2020-10-23 NOTE — H&P ADULT - ASSESSMENT
74-year-old, female patient with a past medical history of HTN, DM, HLD, CAD. She presented for a viral-like symptoms that have been going on for the past 6 days. The patient endorses having fever (undocumented, afebrile here), sore throat, and a dry non-productive cough, she also reports anosmia and decreased appetite The patient is [place] and [place] the [place] (she was in the [place] denies sick contacts at home or overseas, patient denies any chest pain or shortness of breath at the moment. The patient was in the ER and sent home, but was called back because of positive blood cultures.  Patient was seen in ED on 10/22, CXR with bilateral opacities, COVID-19 positive.     #Positive Blood Cultures:   - Gram positive cocci inc clusters, Coagulase Negative in both aerobic and anaerobic set ( 1 set taken only)   - Afebrile, Normotensive, No Leukocytosis, no left shift --> Will not start antibiotics for now  - Repeat blood cultures   - Order TTE ( COVID-19 positive patient)   - If Blood cultures come back positive --> Schedule for MARILUZ if possible and start Abx   - Add to labs d-dimer, crp, procalcitonin, ferritin  - ID consult    #COVID-19 Pneumonia:   - Viral like illness   - Saturation normal on room air   - CXR; Consisted with viral pneumonia ( bilateral opacities)   - PCR: positive   - Air-Borne + Contact isolation   - If desaturation start oxygen on nasal canula and consider remdesivir.   - No indication for remdesivir or dexamethasone at the moment 74-year-old, female patient with a past medical history of HTN, DM, HLD, CAD. She presented for a viral-like symptoms that have been going on for the past 6 days. The patient endorses having fever (undocumented, afebrile here), sore throat, and a dry non-productive cough, she also reports anosmia and decreased appetite The patient is [place] and [place] (she was in the [place] denies sick contacts at home or overseas, patient denies any chest pain or shortness of breath at the moment. The patient was in the ER and sent home, but was called back because of positive blood cultures.  Patient was seen in ED on 10/22, CXR with bilateral opacities, COVID-19 positive.     #Positive Blood Cultures:   - Gram positive cocci inc clusters, Coagulase Negative in both aerobic and anaerobic set ( 1 set taken only)   - Afebrile, Normotensive, No Leukocytosis, no left shift --> Will not start antibiotics for now  - Repeat blood cultures   - Order TTE ( COVID-19 positive patient)   - If Blood cultures come back positive --> Schedule for MARILUZ if possible and start Abx   - Add to labs d-dimer, crp, procalcitonin, ferritin  - ID consult    #COVID-19 Pneumonia:   - Viral like illness   - Saturation normal on room air   - CXR; Consisted with viral pneumonia ( bilateral opacities)   - PCR: positive   - Air-Borne + Contact isolation   - If desaturation start oxygen on nasal canula and consider remdesivir.   - No indication for remdesivir or dexamethasone at the moment    #Type II DM:   - takes metformin 850mg po once daily at home   - Will measure blood sugars and start Insulin protocol accordingly.     #Hypertension:  - Amlodipine 5mg po once daily   - Continue Enalapril 5mg po once daily     #CAD:   - Continue with Aspirin 100mg po once daily   - Continue with Simvastatin 40mg po once daily  - Continue Atenolol 50mg po once daily    #Psychiatric condition:   - patient does not know what her diagnosis is.   - Continue with Sulpiride 50mg.      74-year-old, female patient with a past medical history of HTN, DM, HLD, CAD. She presented for a viral-like symptoms that have been going on for the past 6 days. The patient endorses having fever (undocumented, afebrile here), sore throat, and a dry non-productive cough, she also reports anosmia and decreased appetite The patient is [place] and [place] (she was in the [place] denies sick contacts at home or overseas, patient denies any chest pain or shortness of breath at the moment. The patient was in the ER and sent home, but was called back because of positive blood cultures.  Patient was seen in ED on 10/22, CXR with bilateral opacities, COVID-19 positive.     #Positive Blood Cultures:   - Gram positive cocci inc clusters, Coagulase Negative in both aerobic and anaerobic set ( 1 set taken only)   - Afebrile, Normotensive, No Leukocytosis, no left shift --> Will not start antibiotics for now  - Repeat blood cultures   - Order TTE ( COVID-19 positive patient)   - If Blood cultures come back positive --> Schedule for MARILUZ if possible and start Abx   - Add to labs d-dimer, crp, procalcitonin, ferritin  - ID consult    #COVID-19 Pneumonia:   - Viral like illness   - Saturation normal on room air   - CXR; Consisted with viral pneumonia ( bilateral opacities)   - PCR: positive   - Air-Borne + Contact isolation   - If desaturation start oxygen on nasal canula and consider remdesivir.   - No indication for remdesivir or dexamethasone at the moment    #Type II DM:   - takes metformin 850mg po once daily at home   - Will measure blood sugars and start Insulin protocol accordingly      #Hypertension:  - Amlodipine 5mg po once daily   - Continue Enalapril 5mg po once daily     #CAD:   - Continue with Aspirin 100mg po once daily   - Continue with Simvastatin 20mg po once daily  - Continue Atenolol 50mg po once daily    #Psychiatric condition:   - patient does not know what her diagnosis is.   - Continue with Sulpiride 50mg po q8hrs  - Continue with Duloxetine 30mg po q8hrs

## 2020-10-24 VITALS — TEMPERATURE: 99 F

## 2020-10-24 LAB
-  AMPICILLIN/SULBACTAM: SIGNIFICANT CHANGE UP
-  CEFAZOLIN: SIGNIFICANT CHANGE UP
-  CLINDAMYCIN: SIGNIFICANT CHANGE UP
-  ERYTHROMYCIN: SIGNIFICANT CHANGE UP
-  GENTAMICIN: SIGNIFICANT CHANGE UP
-  OXACILLIN: SIGNIFICANT CHANGE UP
-  PENICILLIN: SIGNIFICANT CHANGE UP
-  RIFAMPIN: SIGNIFICANT CHANGE UP
-  TETRACYCLINE: SIGNIFICANT CHANGE UP
-  TRIMETHOPRIM/SULFAMETHOXAZOLE: SIGNIFICANT CHANGE UP
-  VANCOMYCIN: SIGNIFICANT CHANGE UP
ALBUMIN SERPL ELPH-MCNC: 3.7 G/DL — SIGNIFICANT CHANGE UP (ref 3.5–5.2)
ALP SERPL-CCNC: 55 U/L — SIGNIFICANT CHANGE UP (ref 30–115)
ALT FLD-CCNC: 8 U/L — SIGNIFICANT CHANGE UP (ref 0–41)
ANION GAP SERPL CALC-SCNC: 11 MMOL/L — SIGNIFICANT CHANGE UP (ref 7–14)
AST SERPL-CCNC: 17 U/L — SIGNIFICANT CHANGE UP (ref 0–41)
BASOPHILS # BLD AUTO: 0.01 K/UL — SIGNIFICANT CHANGE UP (ref 0–0.2)
BASOPHILS NFR BLD AUTO: 0.3 % — SIGNIFICANT CHANGE UP (ref 0–1)
BILIRUB SERPL-MCNC: 0.3 MG/DL — SIGNIFICANT CHANGE UP (ref 0.2–1.2)
BUN SERPL-MCNC: 10 MG/DL — SIGNIFICANT CHANGE UP (ref 10–20)
CALCIUM SERPL-MCNC: 8.9 MG/DL — SIGNIFICANT CHANGE UP (ref 8.5–10.1)
CHLORIDE SERPL-SCNC: 99 MMOL/L — SIGNIFICANT CHANGE UP (ref 98–110)
CO2 SERPL-SCNC: 29 MMOL/L — SIGNIFICANT CHANGE UP (ref 17–32)
CREAT SERPL-MCNC: 0.6 MG/DL — LOW (ref 0.7–1.5)
CRP SERPL-MCNC: 4.86 MG/DL — HIGH (ref 0–0.4)
CRP SERPL-MCNC: 5.04 MG/DL — HIGH (ref 0–0.4)
CULTURE RESULTS: SIGNIFICANT CHANGE UP
EOSINOPHIL # BLD AUTO: 0.01 K/UL — SIGNIFICANT CHANGE UP (ref 0–0.7)
EOSINOPHIL NFR BLD AUTO: 0.3 % — SIGNIFICANT CHANGE UP (ref 0–8)
FERRITIN SERPL-MCNC: 120 NG/ML — SIGNIFICANT CHANGE UP (ref 15–150)
GLUCOSE BLDC GLUCOMTR-MCNC: 119 MG/DL — HIGH (ref 70–99)
GLUCOSE SERPL-MCNC: 116 MG/DL — HIGH (ref 70–99)
GRAM STN FLD: SIGNIFICANT CHANGE UP
HCT VFR BLD CALC: 34.3 % — LOW (ref 37–47)
HCV AB S/CO SERPL IA: 0.04 COI — SIGNIFICANT CHANGE UP
HCV AB SERPL-IMP: SIGNIFICANT CHANGE UP
HGB BLD-MCNC: 10.8 G/DL — LOW (ref 12–16)
IMM GRANULOCYTES NFR BLD AUTO: 0.3 % — SIGNIFICANT CHANGE UP (ref 0.1–0.3)
LYMPHOCYTES # BLD AUTO: 1.21 K/UL — SIGNIFICANT CHANGE UP (ref 1.2–3.4)
LYMPHOCYTES # BLD AUTO: 39 % — SIGNIFICANT CHANGE UP (ref 20.5–51.1)
MAGNESIUM SERPL-MCNC: 1.8 MG/DL — SIGNIFICANT CHANGE UP (ref 1.8–2.4)
MCHC RBC-ENTMCNC: 24.4 PG — LOW (ref 27–31)
MCHC RBC-ENTMCNC: 31.5 G/DL — LOW (ref 32–37)
MCV RBC AUTO: 77.6 FL — LOW (ref 81–99)
METHOD TYPE: SIGNIFICANT CHANGE UP
MONOCYTES # BLD AUTO: 0.31 K/UL — SIGNIFICANT CHANGE UP (ref 0.1–0.6)
MONOCYTES NFR BLD AUTO: 10 % — HIGH (ref 1.7–9.3)
NEUTROPHILS # BLD AUTO: 1.55 K/UL — SIGNIFICANT CHANGE UP (ref 1.4–6.5)
NEUTROPHILS NFR BLD AUTO: 50.1 % — SIGNIFICANT CHANGE UP (ref 42.2–75.2)
NRBC # BLD: 0 /100 WBCS — SIGNIFICANT CHANGE UP (ref 0–0)
ORGANISM # SPEC MICROSCOPIC CNT: SIGNIFICANT CHANGE UP
ORGANISM # SPEC MICROSCOPIC CNT: SIGNIFICANT CHANGE UP
PLATELET # BLD AUTO: 242 K/UL — SIGNIFICANT CHANGE UP (ref 130–400)
POTASSIUM SERPL-MCNC: 3.9 MMOL/L — SIGNIFICANT CHANGE UP (ref 3.5–5)
POTASSIUM SERPL-SCNC: 3.9 MMOL/L — SIGNIFICANT CHANGE UP (ref 3.5–5)
PROCALCITONIN SERPL-MCNC: 0.06 NG/ML — SIGNIFICANT CHANGE UP (ref 0.02–0.1)
PROCALCITONIN SERPL-MCNC: 0.06 NG/ML — SIGNIFICANT CHANGE UP (ref 0.02–0.1)
PROT SERPL-MCNC: 6.6 G/DL — SIGNIFICANT CHANGE UP (ref 6–8)
RBC # BLD: 4.42 M/UL — SIGNIFICANT CHANGE UP (ref 4.2–5.4)
RBC # FLD: 13.7 % — SIGNIFICANT CHANGE UP (ref 11.5–14.5)
SODIUM SERPL-SCNC: 139 MMOL/L — SIGNIFICANT CHANGE UP (ref 135–146)
SPECIMEN SOURCE: SIGNIFICANT CHANGE UP
SPECIMEN SOURCE: SIGNIFICANT CHANGE UP
WBC # BLD: 3.1 K/UL — LOW (ref 4.8–10.8)
WBC # FLD AUTO: 3.1 K/UL — LOW (ref 4.8–10.8)

## 2020-10-24 PROCEDURE — 99223 1ST HOSP IP/OBS HIGH 75: CPT

## 2020-10-24 RX ORDER — ASPIRIN/CALCIUM CARB/MAGNESIUM 324 MG
1 TABLET ORAL
Qty: 30 | Refills: 0
Start: 2020-10-24 | End: 2020-11-22

## 2020-10-24 RX ORDER — SIMVASTATIN 20 MG/1
1 TABLET, FILM COATED ORAL
Qty: 0 | Refills: 0 | DISCHARGE
Start: 2020-10-24

## 2020-10-24 RX ORDER — ATENOLOL 25 MG/1
1 TABLET ORAL
Qty: 0 | Refills: 0 | DISCHARGE
Start: 2020-10-24

## 2020-10-24 RX ORDER — DULOXETINE HYDROCHLORIDE 30 MG/1
1 CAPSULE, DELAYED RELEASE ORAL
Qty: 0 | Refills: 0 | DISCHARGE
Start: 2020-10-24

## 2020-10-24 RX ORDER — ACETAMINOPHEN 500 MG
650 TABLET ORAL ONCE
Refills: 0 | Status: COMPLETED | OUTPATIENT
Start: 2020-10-24 | End: 2020-10-24

## 2020-10-24 RX ORDER — AMLODIPINE BESYLATE 2.5 MG/1
1 TABLET ORAL
Qty: 0 | Refills: 0 | DISCHARGE
Start: 2020-10-24

## 2020-10-24 RX ORDER — PANTOPRAZOLE SODIUM 20 MG/1
1 TABLET, DELAYED RELEASE ORAL
Qty: 0 | Refills: 0 | DISCHARGE
Start: 2020-10-24

## 2020-10-24 RX ORDER — ASPIRIN/CALCIUM CARB/MAGNESIUM 324 MG
81 TABLET ORAL DAILY
Refills: 0 | Status: DISCONTINUED | OUTPATIENT
Start: 2020-10-24 | End: 2020-10-24

## 2020-10-24 RX ADMIN — Medication 650 MILLIGRAM(S): at 03:36

## 2020-10-24 RX ADMIN — PANTOPRAZOLE SODIUM 40 MILLIGRAM(S): 20 TABLET, DELAYED RELEASE ORAL at 05:41

## 2020-10-24 RX ADMIN — ATENOLOL 50 MILLIGRAM(S): 25 TABLET ORAL at 05:40

## 2020-10-24 RX ADMIN — CHLORHEXIDINE GLUCONATE 1 APPLICATION(S): 213 SOLUTION TOPICAL at 11:14

## 2020-10-24 RX ADMIN — AMLODIPINE BESYLATE 5 MILLIGRAM(S): 2.5 TABLET ORAL at 05:40

## 2020-10-24 RX ADMIN — ENOXAPARIN SODIUM 40 MILLIGRAM(S): 100 INJECTION SUBCUTANEOUS at 11:13

## 2020-10-24 RX ADMIN — DULOXETINE HYDROCHLORIDE 30 MILLIGRAM(S): 30 CAPSULE, DELAYED RELEASE ORAL at 05:40

## 2020-10-24 RX ADMIN — Medication 5 MILLIGRAM(S): at 05:40

## 2020-10-24 RX ADMIN — Medication 81 MILLIGRAM(S): at 11:14

## 2020-10-24 RX ADMIN — Medication 650 MILLIGRAM(S): at 03:06

## 2020-10-24 NOTE — DISCHARGE NOTE NURSING/CASE MANAGEMENT/SOCIAL WORK - PATIENT PORTAL LINK FT
You can access the FollowMyHealth Patient Portal offered by [facility] by registering at the following website: [email or web address]. By joining [employer]’s FollowMyHealth portal, you will also be able to view your health information using other applications (apps) compatible with our system.

## 2020-10-24 NOTE — DISCHARGE NOTE PROVIDER - NSDCCPCAREPLAN_GEN_ALL_CORE_FT
PRINCIPAL DISCHARGE DIAGNOSIS  Diagnosis: COVID-19  Assessment and Plan of Treatment: you have covid 19 pneumonia. please self quarentinine for 14 days after discharge      SECONDARY DISCHARGE DIAGNOSES  Diagnosis: Positive blood culture  Assessment and Plan of Treatment:     Diagnosis: PNA (pneumonia)  Assessment and Plan of Treatment:      PRINCIPAL DISCHARGE DIAGNOSIS  Diagnosis: COVID-19  Assessment and Plan of Treatment: you have covid 19 pneumonia. not needing any supplemental O2 for now.   Quarantine until 14 days from last day of any clinical symptom. Return to ER if pulse oximeter sats < 90% on room air, worse SOB or Chest pain.      SECONDARY DISCHARGE DIAGNOSES  Diagnosis: Positive blood culture  Assessment and Plan of Treatment:     Diagnosis: PNA (pneumonia)  Assessment and Plan of Treatment:

## 2020-10-24 NOTE — CONSULT NOTE ADULT - ASSESSMENT
74-year-old, female patient with a past medical history of HTN, DM, HLD, CAD. She presented for a viral-like symptoms that have been going on for the past 6 days. The patient endorses having fever (undocumented, afebrile here), sore throat, and a dry non-productive cough, she also reports anosmia and decreased appetite The patient is [place] and [place] (she was in the [place] denies sick contacts at home or overseas, patient denies any chest pain or shortness of breath at the moment. The patient was in the ER and sent home, but was called back because of positive blood cultures.  Patient was seen in ED on 10/22, CXR with bilateral opacities, COVID-19 positive.     IMPRESSION;  COVID-19 with no hypoxemia with CXR < 50 % opacities    RECOMMENDATIONS;  No need for RDV/plasma/steroids  d/c home on quarantine for 14 days from onset of symptoms  recall prn please

## 2020-10-24 NOTE — DISCHARGE NOTE PROVIDER - NSDCMRMEDTOKEN_GEN_ALL_CORE_FT
amLODIPine 5 mg oral tablet: 1 tab(s) orally once a day  aspirin 81 mg oral delayed release tablet: 1 tab(s) orally once a day   atenolol 50 mg oral tablet: 1 tab(s) orally once a day  DULoxetine 30 mg oral delayed release capsule: 1 cap(s) orally every 12 hours  enalapril 5 mg oral tablet: 1 tab(s) orally once a day  ibuprofen 800 mg oral tablet: 1 tab(s) orally 3 times a day   pantoprazole 40 mg oral delayed release tablet: 1 tab(s) orally once a day (before a meal)  simvastatin 20 mg oral tablet: 1 tab(s) orally once a day (at bedtime)   amLODIPine 5 mg oral tablet: 1 tab(s) orally once a day  aspirin 81 mg oral delayed release tablet: 1 tab(s) orally once a day   atenolol 50 mg oral tablet: 1 tab(s) orally once a day  DULoxetine 30 mg oral delayed release capsule: 1 cap(s) orally every 12 hours  enalapril 5 mg oral tablet: 1 tab(s) orally once a day  pantoprazole 40 mg oral delayed release tablet: 1 tab(s) orally once a day (before a meal)  simvastatin 20 mg oral tablet: 1 tab(s) orally once a day (at bedtime)

## 2020-10-24 NOTE — DISCHARGE NOTE PROVIDER - HOSPITAL COURSE
74-year-old, female patient with a past medical history of HTN, DM, HLD, CAD. She presented for a viral-like symptoms that have been going on for the past 6 days. The patient endorses having fever (undocumented, afebrile here), sore throat, and a dry non-productive cough, she also reports anosmia and decreased appetite The patient is [place] (resides in [place] and [place] the [place] a visit [place] 1 week ago, denies sick contacts at home or overseas, patient denies any chest pain or shortness of breath at the moment. The patient was in the ER and sent home, but was called back because of positive blood cultures.  Patient was seen in ED on 10/22, CXR with bilateral opacities, COVID-19 positive. Patient's blood culture is positive x 2 for staph epidermidis and staph hominis. Likely contaminant, 1 episode of fever, no leukocytosis, does not fit the SIRS critieria. On 10/24, patient is clinically stable, sating well on room air, discharged home.    74-year-old, female patient with a past medical history of HTN, DM, HLD, CAD. She presented for a viral-like symptoms that have been going on for the past 6 days. The patient endorses having fever (undocumented, afebrile here), sore throat, and a dry non-productive cough, she also reports anosmia and decreased appetite The patient is [place] (resides in [place] and [place] the [place] 1 [place] ago, denies sick contacts at home or overseas, patient denies any chest pain or shortness of breath at the moment. The patient was in the ER and sent home, but was called back because of positive blood cultures.  Patient was seen in ED on 10/22, CXR with bilateral opacities, COVID-19 positive. Patient's blood culture is positive x 2 for staph epidermidis and staph hominis. Likely contaminant, 1 episode of fever, no leukocytosis, does not fit the SIRS critieria. On 10/24, patient is clinically stable, sating well on room air, discharged home.     Attending Note:  Patient seen and examined independently. I personally had a face-to-face encounter with the patient, examined the patient myself and reviewed the plan of care with the housestaff. Agree with resident's note but my note supersedes that of the resident in the matters hereby listed.   73 y/o F w/ HTN, DM, HLD, CAD p/w viral symptoms x 6 days. Found to have Covid PNA with mild b/l interstitial opacities. However she is very early in the course of disease.  As of now not requiring any supplemental O2. Does not qualify for Remdesivir.   Ok for d/c to quarantine at home per ID.  Quarantine until 14 days from last day of any clinical symptom. Return to ER if pulse oximeter sats < 90% on room air, worse SOB or Chest pain. 74-year-old, female patient with a past medical history of HTN, DM, HLD, CAD. She presented for a viral-like symptoms that have been going on for the past 6 days. The patient endorses having fever (undocumented, afebrile here), sore throat, and a dry non-productive cough, she also reports anosmia and decreased appetite The patient is [place] (resides in [place] and [place] the [place] approximately 1 [place] ago, denies sick contacts at home or overseas, patient denies any chest pain or shortness of breath at the moment. The patient was in the ER and sent home, but was called back because of positive blood cultures.  Patient was seen in ED on 10/22, CXR with bilateral opacities, COVID-19 positive. Patient's blood culture is positive x 2 for staph epidermidis and staph hominis. Likely contaminant, 1 episode of fever, no leukocytosis, does not fit the SIRS critieria. On 10/24, patient is clinically stable, sating well on room air, discharged home. follow up with PCP in 2 weeks    Attending Note:  Patient seen and examined independently. I personally had a face-to-face encounter with the patient, examined the patient myself and reviewed the plan of care with the housestaff. Agree with resident's note but my note supersedes that of the resident in the matters hereby listed.   75 y/o F w/ HTN, DM, HLD, CAD p/w viral symptoms x 6 days. Found to have Covid PNA with mild b/l interstitial opacities. However she is very early in the course of disease.  As of now not requiring any supplemental O2. Does not qualify for Remdesivir.   Ok for d/c to quarantine at home per ID.  Quarantine until 14 days from last day of any clinical symptom. Return to ER if pulse oximeter sats < 90% on room air, worse SOB or Chest pain.

## 2020-10-24 NOTE — CONSULT NOTE ADULT - SUBJECTIVE AND OBJECTIVE BOX
JUSTINE CESPEDES  74y, Female  Allergy: No Known Allergies      All historical available data reviewed.    HPI:  74-year-old, female patient with a past medical history of HTN, DM, HLD, CAD. She presented for a viral-like symptoms that have been going on for the past 6 days. The patient endorses having fever (undocumented, afebrile here), sore throat, and a dry non-productive cough, she also reports anosmia and decreased appetite The patient is [place] (resides in [place] and [place] the [place] visit approximately 1 [place] ago, denies sick contacts at home or overseas, patient denies any chest pain or shortness of breath at the moment. The patient was in the ER and sent home, but was called back because of positive blood cultures.  Patient was seen in ED on 10/22, CXR with bilateral opacities, COVID-19 positive.     ID called for COVID-19      FAMILY HISTORY:    PAST MEDICAL & SURGICAL HISTORY:  Hypertension    Diabetes          VITALS:  T(F): 99, Max: 101.4 (10-24-20 @ 01:00)  HR: 84  BP: 128/81  RR: 18Vital Signs Last 24 Hrs  T(C): 37.2 (24 Oct 2020 11:00), Max: 38.6 (24 Oct 2020 01:00)  T(F): 99 (24 Oct 2020 11:00), Max: 101.4 (24 Oct 2020 01:00)  HR: 84 (24 Oct 2020 05:00) (62 - 84)  BP: 128/81 (24 Oct 2020 05:00) (128/81 - 158/68)  BP(mean): --  RR: 18 (24 Oct 2020 05:00) (18 - 20)  SpO2: 98% (24 Oct 2020 05:00) (94% - 98%)    TESTS & MEASUREMENTS:                        10.8   3.10  )-----------( 242      ( 24 Oct 2020 07:30 )             34.3     10-24    139  |  99  |  10  ----------------------------<  116<H>  3.9   |  29  |  0.6<L>    Ca    8.9      24 Oct 2020 07:30  Mg     1.8     10-24    TPro  6.6  /  Alb  3.7  /  TBili  0.3  /  DBili  x   /  AST  17  /  ALT  8   /  AlkPhos  55  10-24    LIVER FUNCTIONS - ( 24 Oct 2020 07:30 )  Alb: 3.7 g/dL / Pro: 6.6 g/dL / ALK PHOS: 55 U/L / ALT: 8 U/L / AST: 17 U/L / GGT: x             Culture - Urine (collected 10-22-20 @ 02:30)  Source: .Urine Clean Catch (Midstream)  Final Report (10-23-20 @ 23:39):    <10,000 CFU/mL Normal Urogenital Chacha    Culture - Blood (collected 10-22-20 @ 00:45)  Source: .Blood Blood-Peripheral  Gram Stain (10-23-20 @ 05:02):    Growth in anaerobic bottle: Gram Positive Cocci in Clusters    Growth in aerobic bottle: Gram Positive Cocci in Clusters  Preliminary Report (10-23-20 @ 22:46):    Growth in anaerobic bottle: Staphylococcus hominis    Growth in aerobic bottle: Gram Positive Cocci in Clusters    Culture - Blood (collected 10-22-20 @ 00:45)  Source: .Blood Blood-Peripheral  Gram Stain (10-23-20 @ 03:16):    Growth in aerobic bottle: Gram Positive Cocci in Clusters    Growth in anaerobic bottle: Gram Positive Cocci in Clusters  Preliminary Report (10-23-20 @ 22:45):    Growth in aerobic bottle: Staphylococcus hominis    Growth in aerobic bottle: Staphylococcus epidermidis    Coag Negative Staphylococcus    Single set isolate, possible contaminant. Contact    Microbiology if susceptibility testing clinically    indicated.    Growth in anaerobic bottle: Gram Positive Cocci in Clusters    ***Blood Panel PCR results on this specimen are available    approximately 3 hours after the Gram stain result.***    Gram stain, PCR, and/or culture results may not always    correspond due to difference in methodologies.    ************************************************************    This PCR assay was performed using [employer].    The following targets are tested for: Enterococcus,    vancomycin resistant enterococci, Listeria monocytogenes,    coagulase negative staphylococci, S. aureus,    methicillin resistant S. aureus, Streptococcus agalactiae    (Group B), S. pneumoniae, S. pyogenes (Group A),    Acinetobacter baumannii, Enterobacter cloacae, E. coli,    Klebsiella oxytoca, K. pneumoniae, Proteus sp.,    Serratia marcescens, Haemophilus influenzae,    Neisseria meningitidis, Pseudomonas aeruginosa, Candida    albicans, C. glabrata, C krusei, C parapsilosis,    C. tropicalis and the KPC resistance gene.    "Due to technical problems, Proteus sp. will Not be reported as part of    the BCID panel until further notice"  Organism: Blood Culture PCR (10-23-20 @ 02:09)  Organism: Blood Culture PCR (10-23-20 @ 02:09)      -  Coagulase negative Staphylococcus: Detec      Method Type: PCR            RADIOLOGY & ADDITIONAL TESTS:  Personal review of radiological diagnostics performed  Echo and EKG results noted when applicable.     MEDICATIONS:  amLODIPine   Tablet 5 milliGRAM(s) Oral daily  aspirin 81 milliGRAM(s) Oral daily  ATENolol  Tablet 50 milliGRAM(s) Oral daily  chlorhexidine 4% Liquid 1 Application(s) Topical daily  DULoxetine 30 milliGRAM(s) Oral every 12 hours  enalapril 5 milliGRAM(s) Oral daily  enoxaparin Injectable 40 milliGRAM(s) SubCutaneous daily  pantoprazole    Tablet 40 milliGRAM(s) Oral before breakfast  simvastatin 20 milliGRAM(s) Oral at bedtime      ANTIBIOTICS:

## 2020-10-24 NOTE — DISCHARGE NOTE PROVIDER - NSDCHOSPICE_GEN_A_CORE
Calling regarding: Pt states he would like doctor to prescribe RX: Accu-chek multi-clix lancets.  Please advise    Caller: pt    Timeframe for callback: today    Pharmacy information verified:  Yes     Phone number to be reached at:  CELL: [phone]         
Last ov: 12/13/18    Called pt to verify what lancets he would like. Below requested lancets didn't match the lancet on his med list that was just ended on 6/7/19.  Pt became very angry. He kept stating he had to take apart his lancets and take out the needle and dispose of.   I tried to let him know that a lancet is/should be a one time use.   He disagreed again, getting angry and raising his voice.   I recommended that he take device and lancets to his pharmacist for clarification.    Test strips added to refill request as last refill: 6/25/18 100 w11r.  
No

## 2020-10-24 NOTE — DISCHARGE NOTE PROVIDER - NSFOLLOWUPCLINICS_GEN_ALL_ED_FT
A Family Medicine Doctor  Family Medicine  .  [place]   Phone:   Fax:   Follow Up Time:      A Family Medicine Doctor  Family Medicine  .  [place]   Phone:   Fax:   Follow Up Time: 2 weeks

## 2020-10-25 LAB
CULTURE RESULTS: SIGNIFICANT CHANGE UP
ORGANISM # SPEC MICROSCOPIC CNT: SIGNIFICANT CHANGE UP
ORGANISM # SPEC MICROSCOPIC CNT: SIGNIFICANT CHANGE UP
SARS-COV-2 IGG SERPL QL IA: NEGATIVE — SIGNIFICANT CHANGE UP
SARS-COV-2 IGM SERPL IA-ACNC: <0.1 INDEX — SIGNIFICANT CHANGE UP
SPECIMEN SOURCE: SIGNIFICANT CHANGE UP

## 2020-10-25 NOTE — CHART NOTE - NSCHARTNOTEFT_GEN_A_CORE
Called by [facility] lab regarding 10/23 positive blood cultures of Gram + cocci in clusters in aerobic bottle only (same as previously contaminated blood cultures on 10/22). Patient was seen by ID prior to discharge (admitted to hospital for the contaminated 10/22 blood cultures) for mild COVID, no complications during hospitalization course.   Again this is likely again a containment.

## 2020-10-26 LAB
-  AMPICILLIN/SULBACTAM: SIGNIFICANT CHANGE UP
-  CEFAZOLIN: SIGNIFICANT CHANGE UP
-  CLINDAMYCIN: SIGNIFICANT CHANGE UP
-  ERYTHROMYCIN: SIGNIFICANT CHANGE UP
-  GENTAMICIN: SIGNIFICANT CHANGE UP
-  OXACILLIN: SIGNIFICANT CHANGE UP
-  PENICILLIN: SIGNIFICANT CHANGE UP
-  RIFAMPIN: SIGNIFICANT CHANGE UP
-  TETRACYCLINE: SIGNIFICANT CHANGE UP
-  TRIMETHOPRIM/SULFAMETHOXAZOLE: SIGNIFICANT CHANGE UP
-  VANCOMYCIN: SIGNIFICANT CHANGE UP
CULTURE RESULTS: SIGNIFICANT CHANGE UP
METHOD TYPE: SIGNIFICANT CHANGE UP
ORGANISM # SPEC MICROSCOPIC CNT: SIGNIFICANT CHANGE UP
ORGANISM # SPEC MICROSCOPIC CNT: SIGNIFICANT CHANGE UP
SPECIMEN SOURCE: SIGNIFICANT CHANGE UP

## 2020-10-28 DIAGNOSIS — J12.89 OTHER VIRAL PNEUMONIA: ICD-10-CM

## 2020-10-28 DIAGNOSIS — E11.9 TYPE 2 DIABETES MELLITUS WITHOUT COMPLICATIONS: ICD-10-CM

## 2020-10-28 DIAGNOSIS — I10 ESSENTIAL (PRIMARY) HYPERTENSION: ICD-10-CM

## 2020-10-28 DIAGNOSIS — U07.1 COVID-19: ICD-10-CM

## 2020-10-28 DIAGNOSIS — I25.10 ATHEROSCLEROTIC HEART DISEASE OF NATIVE CORONARY ARTERY WITHOUT ANGINA PECTORIS: ICD-10-CM

## 2020-10-28 DIAGNOSIS — E78.5 HYPERLIPIDEMIA, UNSPECIFIED: ICD-10-CM

## 2020-10-29 LAB
CULTURE RESULTS: SIGNIFICANT CHANGE UP
CULTURE RESULTS: SIGNIFICANT CHANGE UP
SPECIMEN SOURCE: SIGNIFICANT CHANGE UP
SPECIMEN SOURCE: SIGNIFICANT CHANGE UP

## 2022-06-10 NOTE — ED PROVIDER NOTE - CLINICAL SUMMARY MEDICAL DECISION MAKING FREE TEXT BOX
viral sx & ur frequency, visiting from [place] & recent close contact w/covid+ family member - labs wnl, no uti, cxr suspicious for covid, routine testing sent - pt reassessed & saturating well on RA both @ rest & w/exertion - all results d/w pt & her granddaughter Payal with whom she is staying cell [phone], copies given, strict return/iso precautions discussed, pulse ox given & pt/granddaughter instructed on use, rec op pmd f/u, as pt is visiting [place] advised to return to ED for any worsening sx or concerns 5-Fu Counseling: 5-Fluorouracil Counseling:  I discussed with the patient the risks of 5-fluorouracil including but not limited to erythema, scaling, itching, weeping, crusting, and pain.

## 2022-06-21 NOTE — ED ADULT NURSE NOTE - NSFALLRSKASSESASSIST_ED_ALL_ED
DOS:  06/22/2022 Arrival time 0715   Patient to report to [facility] 3rd floor, [facility]  Pre-procedure interview was completed. Patient was instructed to take the following medications on the day of surgery with sips of water:    Metoprolol        Patient aware of open and limited visitor policy. Patient aware of visitors, support person/chaperones are allowed for all outpatients and inpatients visits. Patient is aware all visitors must follow PPE precaution if in isolation including COVID-19  
yes

## 2022-08-25 NOTE — PATIENT PROFILE ADULT - NSPRESCRUSEDDRG_GEN_A_NUR
Medical screening exam completed.  I have conducted a focused provider triage encounter, findings are as follows:    Brief history of present illness:  27 yo female presents to ED for evaluation of multiple complaints. Patient reports accidentally took amlodipine and labatelol this morning and feeling lightheaded. States feeling better now. Patient also complains of having a umbilical hernia causing her pain. States has surgery scheduled for next week.    Vitals:    08/25/22 0956   BP: (!) 150/93   BP Location: Left arm   Patient Position: Sitting   Pulse: 86   Resp: 14   Temp: 99 °F (37.2 °C)   TempSrc: Oral   SpO2: 96%       Pertinent physical exam:  Awake, alerted and oriented. Ambulated into triage.     Brief workup plan:  Labs, UA, UPT    Preliminary workup initiated; this workup will be continued and followed by the physician or advanced practice provider that is assigned to the patient when roomed.  
No